# Patient Record
Sex: FEMALE | Race: WHITE | NOT HISPANIC OR LATINO | URBAN - METROPOLITAN AREA
[De-identification: names, ages, dates, MRNs, and addresses within clinical notes are randomized per-mention and may not be internally consistent; named-entity substitution may affect disease eponyms.]

---

## 2024-07-15 PROBLEM — Z00.00 ENCOUNTER FOR PREVENTIVE HEALTH EXAMINATION: Status: ACTIVE | Noted: 2024-07-15

## 2024-07-16 ENCOUNTER — OUTPATIENT (OUTPATIENT)
Dept: OUTPATIENT SERVICES | Facility: HOSPITAL | Age: 34
LOS: 1 days | End: 2024-07-16

## 2024-07-16 ENCOUNTER — APPOINTMENT (OUTPATIENT)
Dept: OBGYN | Facility: CLINIC | Age: 34
End: 2024-07-16
Payer: COMMERCIAL

## 2024-07-16 VITALS
TEMPERATURE: 98 F | OXYGEN SATURATION: 98 % | RESPIRATION RATE: 16 BRPM | SYSTOLIC BLOOD PRESSURE: 118 MMHG | HEART RATE: 65 BPM | DIASTOLIC BLOOD PRESSURE: 72 MMHG | WEIGHT: 134.92 LBS | HEIGHT: 67 IN

## 2024-07-16 VITALS
BODY MASS INDEX: 20.25 KG/M2 | DIASTOLIC BLOOD PRESSURE: 70 MMHG | SYSTOLIC BLOOD PRESSURE: 118 MMHG | WEIGHT: 129 LBS | HEIGHT: 67 IN

## 2024-07-16 DIAGNOSIS — O02.1 MISSED ABORTION: ICD-10-CM

## 2024-07-16 DIAGNOSIS — K08.409 PARTIAL LOSS OF TEETH, UNSPECIFIED CAUSE, UNSPECIFIED CLASS: Chronic | ICD-10-CM

## 2024-07-16 DIAGNOSIS — Z98.890 OTHER SPECIFIED POSTPROCEDURAL STATES: Chronic | ICD-10-CM

## 2024-07-16 LAB
APTT BLD: 29 SEC — SIGNIFICANT CHANGE UP (ref 24.5–35.6)
BLD GP AB SCN SERPL QL: NEGATIVE — SIGNIFICANT CHANGE UP
FIBRINOGEN PPP-MCNC: 304 MG/DL — SIGNIFICANT CHANGE UP (ref 200–465)
HCT VFR BLD CALC: 37.8 % — SIGNIFICANT CHANGE UP (ref 34.5–45)
HGB BLD-MCNC: 12.9 G/DL — SIGNIFICANT CHANGE UP (ref 11.5–15.5)
INR BLD: 0.97 RATIO — SIGNIFICANT CHANGE UP (ref 0.85–1.18)
MCHC RBC-ENTMCNC: 30 PG — SIGNIFICANT CHANGE UP (ref 27–34)
MCHC RBC-ENTMCNC: 34.1 GM/DL — SIGNIFICANT CHANGE UP (ref 32–36)
MCV RBC AUTO: 87.9 FL — SIGNIFICANT CHANGE UP (ref 80–100)
NRBC # BLD: 0 /100 WBCS — SIGNIFICANT CHANGE UP (ref 0–0)
NRBC # FLD: 0 K/UL — SIGNIFICANT CHANGE UP (ref 0–0)
PLATELET # BLD AUTO: 275 K/UL — SIGNIFICANT CHANGE UP (ref 150–400)
PROTHROM AB SERPL-ACNC: 10.9 SEC — SIGNIFICANT CHANGE UP (ref 9.5–13)
RBC # BLD: 4.3 M/UL — SIGNIFICANT CHANGE UP (ref 3.8–5.2)
RBC # FLD: 13.6 % — SIGNIFICANT CHANGE UP (ref 10.3–14.5)
RH IG SCN BLD-IMP: POSITIVE — SIGNIFICANT CHANGE UP
WBC # BLD: 10.3 K/UL — SIGNIFICANT CHANGE UP (ref 3.8–10.5)
WBC # FLD AUTO: 10.3 K/UL — SIGNIFICANT CHANGE UP (ref 3.8–10.5)

## 2024-07-16 PROCEDURE — S0190: CPT

## 2024-07-16 PROCEDURE — 99204 OFFICE O/P NEW MOD 45 MIN: CPT | Mod: 25

## 2024-07-16 PROCEDURE — 76815 OB US LIMITED FETUS(S): CPT

## 2024-07-16 NOTE — H&P PST ADULT - NSANTHOSAYNRD_GEN_A_CORE
No. JOHNIE screening performed.  STOP BANG Legend: 0-2 = LOW Risk; 3-4 = INTERMEDIATE Risk; 5-8 = HIGH Risk

## 2024-07-16 NOTE — H&P PST ADULT - HISTORY OF PRESENT ILLNESS
34 yr old female presents with missed , reports 17 week sono noted missed , estimated gestation ~14 weeks, reports mild vaginal bleeding

## 2024-07-16 NOTE — H&P PST ADULT - PROBLEM SELECTOR PLAN 1
Patient scheduled for surgery on: 7/18/24  Provided with verbal and written presurgical instructions

## 2024-07-17 ENCOUNTER — INPATIENT (INPATIENT)
Facility: HOSPITAL | Age: 34
LOS: 0 days | Discharge: HOME | DRG: 779 | End: 2024-07-18
Attending: INTERNAL MEDICINE | Admitting: INTERNAL MEDICINE
Payer: SELF-PAY

## 2024-07-17 VITALS
DIASTOLIC BLOOD PRESSURE: 75 MMHG | HEIGHT: 68 IN | WEIGHT: 130.07 LBS | OXYGEN SATURATION: 99 % | RESPIRATION RATE: 20 BRPM | HEART RATE: 93 BPM | SYSTOLIC BLOOD PRESSURE: 109 MMHG | TEMPERATURE: 98 F

## 2024-07-17 DIAGNOSIS — N93.9 ABNORMAL UTERINE AND VAGINAL BLEEDING, UNSPECIFIED: ICD-10-CM

## 2024-07-17 DIAGNOSIS — Z98.890 OTHER SPECIFIED POSTPROCEDURAL STATES: Chronic | ICD-10-CM

## 2024-07-17 DIAGNOSIS — K08.409 PARTIAL LOSS OF TEETH, UNSPECIFIED CAUSE, UNSPECIFIED CLASS: Chronic | ICD-10-CM

## 2024-07-17 LAB
ALBUMIN SERPL ELPH-MCNC: 3.8 G/DL — SIGNIFICANT CHANGE UP (ref 3.3–5)
ALP SERPL-CCNC: 65 U/L — SIGNIFICANT CHANGE UP (ref 40–120)
ALT FLD-CCNC: 7 U/L — LOW (ref 10–45)
ANION GAP SERPL CALC-SCNC: 15 MMOL/L — SIGNIFICANT CHANGE UP (ref 5–17)
APPEARANCE UR: CLEAR — SIGNIFICANT CHANGE UP
APTT BLD: 26.1 SEC — SIGNIFICANT CHANGE UP (ref 24.5–35.6)
AST SERPL-CCNC: 15 U/L — SIGNIFICANT CHANGE UP (ref 10–40)
BACTERIA # UR AUTO: NEGATIVE /HPF — SIGNIFICANT CHANGE UP
BASOPHILS # BLD AUTO: 0.03 K/UL — SIGNIFICANT CHANGE UP (ref 0–0.2)
BASOPHILS # BLD AUTO: 0.04 K/UL — SIGNIFICANT CHANGE UP (ref 0–0.2)
BASOPHILS # BLD AUTO: 0.05 K/UL — SIGNIFICANT CHANGE UP (ref 0–0.2)
BASOPHILS NFR BLD AUTO: 0.2 % — SIGNIFICANT CHANGE UP (ref 0–2)
BASOPHILS NFR BLD AUTO: 0.3 % — SIGNIFICANT CHANGE UP (ref 0–2)
BASOPHILS NFR BLD AUTO: 0.4 % — SIGNIFICANT CHANGE UP (ref 0–2)
BILIRUB SERPL-MCNC: 1.3 MG/DL — HIGH (ref 0.2–1.2)
BILIRUB UR-MCNC: NEGATIVE — SIGNIFICANT CHANGE UP
BLD GP AB SCN SERPL QL: NEGATIVE — SIGNIFICANT CHANGE UP
BUN SERPL-MCNC: 11 MG/DL — SIGNIFICANT CHANGE UP (ref 7–23)
C TRACH RRNA SPEC QL NAA+PROBE: NOT DETECTED
CALCIUM SERPL-MCNC: 9 MG/DL — SIGNIFICANT CHANGE UP (ref 8.4–10.5)
CAST: 0 /LPF — SIGNIFICANT CHANGE UP (ref 0–4)
CHLORIDE SERPL-SCNC: 102 MMOL/L — SIGNIFICANT CHANGE UP (ref 96–108)
CO2 SERPL-SCNC: 22 MMOL/L — SIGNIFICANT CHANGE UP (ref 22–31)
COLOR SPEC: ABNORMAL
CREAT SERPL-MCNC: 0.69 MG/DL — SIGNIFICANT CHANGE UP (ref 0.5–1.3)
DIFF PNL FLD: ABNORMAL
EGFR: 118 ML/MIN/1.73M2 — SIGNIFICANT CHANGE UP
EOSINOPHIL # BLD AUTO: 0.03 K/UL — SIGNIFICANT CHANGE UP (ref 0–0.5)
EOSINOPHIL # BLD AUTO: 0.06 K/UL — SIGNIFICANT CHANGE UP (ref 0–0.5)
EOSINOPHIL # BLD AUTO: 0.13 K/UL — SIGNIFICANT CHANGE UP (ref 0–0.5)
EOSINOPHIL NFR BLD AUTO: 0.2 % — SIGNIFICANT CHANGE UP (ref 0–6)
EOSINOPHIL NFR BLD AUTO: 0.4 % — SIGNIFICANT CHANGE UP (ref 0–6)
EOSINOPHIL NFR BLD AUTO: 1.1 % — SIGNIFICANT CHANGE UP (ref 0–6)
GLUCOSE SERPL-MCNC: 100 MG/DL — HIGH (ref 70–99)
GLUCOSE UR QL: NEGATIVE MG/DL — SIGNIFICANT CHANGE UP
HCG SERPL-ACNC: 543.2 MIU/ML — HIGH
HCT VFR BLD CALC: 30.1 % — LOW (ref 34.5–45)
HCT VFR BLD CALC: 32.5 % — LOW (ref 34.5–45)
HCT VFR BLD CALC: 37.4 % — SIGNIFICANT CHANGE UP (ref 34.5–45)
HGB BLD-MCNC: 10.3 G/DL — LOW (ref 11.5–15.5)
HGB BLD-MCNC: 11.1 G/DL — LOW (ref 11.5–15.5)
HGB BLD-MCNC: 12.3 G/DL — SIGNIFICANT CHANGE UP (ref 11.5–15.5)
IMM GRANULOCYTES NFR BLD AUTO: 0.4 % — SIGNIFICANT CHANGE UP (ref 0–0.9)
IMM GRANULOCYTES NFR BLD AUTO: 0.5 % — SIGNIFICANT CHANGE UP (ref 0–0.9)
IMM GRANULOCYTES NFR BLD AUTO: 0.6 % — SIGNIFICANT CHANGE UP (ref 0–0.9)
INR BLD: 1.03 RATIO — SIGNIFICANT CHANGE UP (ref 0.85–1.18)
KETONES UR-MCNC: 15 MG/DL
LEUKOCYTE ESTERASE UR-ACNC: ABNORMAL
LYMPHOCYTES # BLD AUTO: 1.32 K/UL — SIGNIFICANT CHANGE UP (ref 1–3.3)
LYMPHOCYTES # BLD AUTO: 1.37 K/UL — SIGNIFICANT CHANGE UP (ref 1–3.3)
LYMPHOCYTES # BLD AUTO: 1.94 K/UL — SIGNIFICANT CHANGE UP (ref 1–3.3)
LYMPHOCYTES # BLD AUTO: 16.2 % — SIGNIFICANT CHANGE UP (ref 13–44)
LYMPHOCYTES # BLD AUTO: 8.6 % — LOW (ref 13–44)
LYMPHOCYTES # BLD AUTO: 9 % — LOW (ref 13–44)
MCHC RBC-ENTMCNC: 29.6 PG — SIGNIFICANT CHANGE UP (ref 27–34)
MCHC RBC-ENTMCNC: 30.1 PG — SIGNIFICANT CHANGE UP (ref 27–34)
MCHC RBC-ENTMCNC: 30.3 PG — SIGNIFICANT CHANGE UP (ref 27–34)
MCHC RBC-ENTMCNC: 32.9 GM/DL — SIGNIFICANT CHANGE UP (ref 32–36)
MCHC RBC-ENTMCNC: 34.2 GM/DL — SIGNIFICANT CHANGE UP (ref 32–36)
MCHC RBC-ENTMCNC: 34.2 GM/DL — SIGNIFICANT CHANGE UP (ref 32–36)
MCV RBC AUTO: 88.1 FL — SIGNIFICANT CHANGE UP (ref 80–100)
MCV RBC AUTO: 88.5 FL — SIGNIFICANT CHANGE UP (ref 80–100)
MCV RBC AUTO: 90.1 FL — SIGNIFICANT CHANGE UP (ref 80–100)
MONOCYTES # BLD AUTO: 0.59 K/UL — SIGNIFICANT CHANGE UP (ref 0–0.9)
MONOCYTES # BLD AUTO: 0.78 K/UL — SIGNIFICANT CHANGE UP (ref 0–0.9)
MONOCYTES # BLD AUTO: 0.8 K/UL — SIGNIFICANT CHANGE UP (ref 0–0.9)
MONOCYTES NFR BLD AUTO: 4 % — SIGNIFICANT CHANGE UP (ref 2–14)
MONOCYTES NFR BLD AUTO: 4.9 % — SIGNIFICANT CHANGE UP (ref 2–14)
MONOCYTES NFR BLD AUTO: 6.7 % — SIGNIFICANT CHANGE UP (ref 2–14)
N GONORRHOEA RRNA SPEC QL NAA+PROBE: NOT DETECTED
NEUTROPHILS # BLD AUTO: 12.63 K/UL — HIGH (ref 1.8–7.4)
NEUTROPHILS # BLD AUTO: 13.63 K/UL — HIGH (ref 1.8–7.4)
NEUTROPHILS # BLD AUTO: 9 K/UL — HIGH (ref 1.8–7.4)
NEUTROPHILS NFR BLD AUTO: 75.2 % — SIGNIFICANT CHANGE UP (ref 43–77)
NEUTROPHILS NFR BLD AUTO: 85.2 % — HIGH (ref 43–77)
NEUTROPHILS NFR BLD AUTO: 86.1 % — HIGH (ref 43–77)
NITRITE UR-MCNC: NEGATIVE — SIGNIFICANT CHANGE UP
NRBC # BLD: 0 /100 WBCS — SIGNIFICANT CHANGE UP (ref 0–0)
PH UR: 7 — SIGNIFICANT CHANGE UP (ref 5–8)
PLATELET # BLD AUTO: 207 K/UL — SIGNIFICANT CHANGE UP (ref 150–400)
PLATELET # BLD AUTO: 224 K/UL — SIGNIFICANT CHANGE UP (ref 150–400)
PLATELET # BLD AUTO: 251 K/UL — SIGNIFICANT CHANGE UP (ref 150–400)
POTASSIUM SERPL-MCNC: 3.4 MMOL/L — LOW (ref 3.5–5.3)
POTASSIUM SERPL-SCNC: 3.4 MMOL/L — LOW (ref 3.5–5.3)
PROT SERPL-MCNC: 6.5 G/DL — SIGNIFICANT CHANGE UP (ref 6–8.3)
PROT UR-MCNC: SIGNIFICANT CHANGE UP MG/DL
PROTHROM AB SERPL-ACNC: 11.3 SEC — SIGNIFICANT CHANGE UP (ref 9.5–13)
RBC # BLD: 3.4 M/UL — LOW (ref 3.8–5.2)
RBC # BLD: 3.69 M/UL — LOW (ref 3.8–5.2)
RBC # BLD: 4.15 M/UL — SIGNIFICANT CHANGE UP (ref 3.8–5.2)
RBC # FLD: 13.5 % — SIGNIFICANT CHANGE UP (ref 10.3–14.5)
RBC # FLD: 13.6 % — SIGNIFICANT CHANGE UP (ref 10.3–14.5)
RBC # FLD: 13.6 % — SIGNIFICANT CHANGE UP (ref 10.3–14.5)
RBC CASTS # UR COMP ASSIST: 603 /HPF — HIGH (ref 0–4)
RH IG SCN BLD-IMP: POSITIVE — SIGNIFICANT CHANGE UP
SODIUM SERPL-SCNC: 139 MMOL/L — SIGNIFICANT CHANGE UP (ref 135–145)
SOURCE AMPLIFICATION: NORMAL
SP GR SPEC: 1.01 — SIGNIFICANT CHANGE UP (ref 1–1.03)
SQUAMOUS # UR AUTO: 3 /HPF — SIGNIFICANT CHANGE UP (ref 0–5)
UROBILINOGEN FLD QL: 1 MG/DL — SIGNIFICANT CHANGE UP (ref 0.2–1)
WBC # BLD: 11.97 K/UL — HIGH (ref 3.8–10.5)
WBC # BLD: 14.68 K/UL — HIGH (ref 3.8–10.5)
WBC # BLD: 15.97 K/UL — HIGH (ref 3.8–10.5)
WBC # FLD AUTO: 11.97 K/UL — HIGH (ref 3.8–10.5)
WBC # FLD AUTO: 14.68 K/UL — HIGH (ref 3.8–10.5)
WBC # FLD AUTO: 15.97 K/UL — HIGH (ref 3.8–10.5)
WBC UR QL: 12 /HPF — HIGH (ref 0–5)

## 2024-07-17 PROCEDURE — 88305 TISSUE EXAM BY PATHOLOGIST: CPT | Mod: 26

## 2024-07-17 PROCEDURE — 88291 CYTO/MOLECULAR REPORT: CPT

## 2024-07-17 PROCEDURE — 93308 TTE F-UP OR LMTD: CPT | Mod: 26

## 2024-07-17 PROCEDURE — 99285 EMERGENCY DEPT VISIT HI MDM: CPT

## 2024-07-17 RX ORDER — SODIUM CHLORIDE 0.9 % (FLUSH) 0.9 %
1000 SYRINGE (ML) INJECTION ONCE
Refills: 0 | Status: COMPLETED | OUTPATIENT
Start: 2024-07-17 | End: 2024-07-17

## 2024-07-17 RX ORDER — POTASSIUM CHLORIDE 600 MG/1
40 TABLET, FILM COATED, EXTENDED RELEASE ORAL ONCE
Refills: 0 | Status: COMPLETED | OUTPATIENT
Start: 2024-07-17 | End: 2024-07-17

## 2024-07-17 RX ORDER — SODIUM CHLORIDE 0.9 % (FLUSH) 0.9 %
1000 SYRINGE (ML) INJECTION
Refills: 0 | Status: DISCONTINUED | OUTPATIENT
Start: 2024-07-17 | End: 2024-07-18

## 2024-07-17 RX ORDER — DEXTROSE MONOHYDRATE AND SODIUM CHLORIDE 5; .3 G/100ML; G/100ML
1000 INJECTION, SOLUTION INTRAVENOUS ONCE
Refills: 0 | Status: COMPLETED | OUTPATIENT
Start: 2024-07-17 | End: 2024-07-17

## 2024-07-17 RX ADMIN — Medication 1000 MILLILITER(S): at 16:18

## 2024-07-17 RX ADMIN — DEXTROSE MONOHYDRATE AND SODIUM CHLORIDE 1000 MILLILITER(S): 5; .3 INJECTION, SOLUTION INTRAVENOUS at 19:39

## 2024-07-17 RX ADMIN — Medication 100 MILLILITER(S): at 23:30

## 2024-07-17 RX ADMIN — POTASSIUM CHLORIDE 40 MILLIEQUIVALENT(S): 600 TABLET, FILM COATED, EXTENDED RELEASE ORAL at 16:18

## 2024-07-17 NOTE — ED PROCEDURE NOTE - CPROC ED TIME OUT STATEMENT1
“Patient's name, , procedure and correct site were confirmed during the [place] Timeout.”
“Patient's name, , procedure and correct site were confirmed during the [place] Timeout.”

## 2024-07-17 NOTE — H&P ADULT - HISTORY OF PRESENT ILLNESS
34-year-old female no reported past medical history , 17 weeks gestation recently found to have miscarriage planned for D&C tomorrow with Dr Koroma, presents brought in by ambulance for heavy vaginal bleeding.  Was seen by a [facility] yesterday, took 1 dose of mifepristone this morning, a few hours after taking developed severe abdominal cramping and has had bright red bleeding since then passing clots and suspected products.  Patient soaked through 2 towels.  He is currently bleeding.  Reports associated lightheadedness/dizziness but no LOC, reports associated lethargy.

## 2024-07-17 NOTE — CONSULT NOTE ADULT - SUBJECTIVE AND OBJECTIVE BOX
HAI LEE    34y    Female    [id number]    **incomplete**    HPI: 35yo  @17w6d (LMP 3/14), recently diagnosed with IUFD, presents with heavy VB. Pt currently visiting from [facility] and diagnosed with IUFD at [facility] on . Seen by FP clinic , sono revealed 14w at that time and scheduled for D&E . Pt given mifepristone to take today and misoprostol . Pt took mifeprostone this morning at 8am as instructed and developed heavy VB at 11:00am. Was soaking towels and passing large clots in one hour and called EMS. Pt endorses lightheadedness and dizziness intermittently. Developed nausea in ED, but denies vomiting.  Denies fevers/chills/vomiting.      Name of Ob/Gyn Physician: Lives in [facility], saw [facility]  and  clinic   OBHx:  FT  x2 (, )  GynHx: Lsc ov cystectomy x2  PMHx: Denies  PSHx: Lsc ov cystectomy x2  Meds: Denies  All: NKDA      Vital Signs Last 24 Hrs  T(C): 36.6 (2024 12:58), Max: 36.6 (2024 12:58)  T(F): 97.8 (2024 12:58), Max: 97.8 (2024 12:58)  HR: 93 (2024 12:58) (93 - 93)  BP: 109/75 (2024 12:58) (109/75 - 109/75)  BP(mean): --  RR: 20 (2024 12:58) (20 - 20)  SpO2: 99% (2024 12:58) (99% - 99%)    Parameters below as of 2024 12:58  Patient On (Oxygen Delivery Method): room air        PHYSICAL EXAM:   Exam performed with Chaperone Dr. Lafleur PGY4  Gen: NAD     Respiratory: Breathing comfortably on RA  Abd: Soft, non-tender, non-distended  Pelvic: Speculum - copious amount of dark red clots and placental tissue in vagina, manually extracted with scant bleeding noted from cervical os afterward  Neuro: AOx3      LABS:                        12.3   11.97 )-----------( 251      ( 2024 13:45 )             37.4         139  |  102  |  11  ----------------------------<  100<H>  3.4<L>   |  22  |  0.69    Ca    9.0      2024 13:45    TPro  6.5  /  Alb  3.8  /  TBili  1.3<H>  /  DBili  x   /  AST  15  /  ALT  7<L>  /  AlkPhos  65      PT/INR - ( 2024 13:45 )   PT: 11.3 sec;   INR: 1.03 ratio         PTT - ( 2024 13:45 )  PTT:26.1 sec  Urinalysis Basic - ( 2024 13:45 )    Color: x / Appearance: x / SG: x / pH: x  Gluc: 100 mg/dL / Ketone: x  / Bili: x / Urobili: x   Blood: x / Protein: x / Nitrite: x   Leuk Esterase: x / RBC: x / WBC x   Sq Epi: x / Non Sq Epi: x / Bacteria: x        RADIOLOGY & ADDITIONAL STUDIES:  ACC: [id number] EXAM:  US OB GRT THAN 14 WKS 1ST GEST   ORDERED BY: SHERI SCHWARTZ     PROCEDURE DATE:  2024          INTERPRETATION:  CLINICAL INFORMATION: Second trimester pregnancy with   demise, scheduled for D&E. Presents today with heavy vaginal bleeding.    LMP: 2024    COMPARISON: None available.    TECHNIQUE:  Transabdominal pelvic sonogram only. Color and Spectral Doppler was   performed.    FINDINGS:  Uterus: Anteverted. 14.8 cm x 7.3 cm x 8.1 cm. Coarse myometrial texture.  Endometrium: 50 mm. Within normal limits.  Vagina: Vaginal canal is distended with avascular soft tissue consistent   with  in progress.    Right ovary: 3.2 cm x 1.9 cm x 4.0 cm. Within normal limits. Normal   arterial and venous waveforms.  Left ovary: 5.7 cm x 2.1 cm x 4.2 cm. Parenchymal distortion consistent   with history of prior cystectomy. Normal arterial and venous waveforms.    Fluid: None.    IMPRESSION:  Products of conception within the vaginal canal consistent with    in progress.        --- End of Report ---            LUIZ BROWN MD; Attending Radiologist  This document has been electronically signed. 2024  2:50PM HAI LEE    34y    Female    [id number]    HPI: 35yo  @17w6d (LMP 3/14), recently diagnosed with IUFD, presents with heavy VB. Pt currently visiting from [facility] and diagnosed with IUFD at [facility] on . Seen by FP clinic , sono revealed 14w at that time and scheduled for D&E . Pt given mifepristone to take today and misoprostol . Pt took mifeprostone this morning at 8am as instructed and developed heavy VB at 11:00am. Was soaking towels and passing large clots in one hour and called EMS. Pt endorses lightheadedness and dizziness intermittently. Developed nausea in ED, but denies vomiting.  Denies fevers/chills/vomiting.      Name of Ob/Gyn Physician: Lives in [facility], saw [facility]  and FP clinic   OBHx:  FT  x2 (, )  GynHx: Lsc ov cystectomy x2  PMHx: Denies  PSHx: Lsc ov cystectomy x2  Meds: Denies  All: NKDA      Vital Signs Last 24 Hrs  T(C): 36.6 (2024 12:58), Max: 36.6 (2024 12:58)  T(F): 97.8 (2024 12:58), Max: 97.8 (2024 12:58)  HR: 93 (2024 12:58) (93 - 93)  BP: 109/75 (2024 12:58) (109/75 - 109/75)  BP(mean): --  RR: 20 (2024 12:58) (20 - 20)  SpO2: 99% (2024 12:58) (99% - 99%)    Parameters below as of 2024 12:58  Patient On (Oxygen Delivery Method): room air        PHYSICAL EXAM:   Exam performed with Chaperone Dr. Lafleur PGY4  Gen: NAD     Respiratory: Breathing comfortably on RA  Abd: Soft, non-tender, non-distended  Pelvic: Speculum - copious amount of dark red clots and placental tissue in vagina, manually extracted with scant bleeding noted from cervical os afterward  Neuro: AOx3      LABS:                        12.3   11.97 )-----------( 251      ( 2024 13:45 )             37.4         139  |  102  |  11  ----------------------------<  100<H>  3.4<L>   |  22  |  0.69    Ca    9.0      2024 13:45    TPro  6.5  /  Alb  3.8  /  TBili  1.3<H>  /  DBili  x   /  AST  15  /  ALT  7<L>  /  AlkPhos  65      PT/INR - ( 2024 13:45 )   PT: 11.3 sec;   INR: 1.03 ratio         PTT - ( 2024 13:45 )  PTT:26.1 sec  Urinalysis Basic - ( 2024 13:45 )    Color: x / Appearance: x / SG: x / pH: x  Gluc: 100 mg/dL / Ketone: x  / Bili: x / Urobili: x   Blood: x / Protein: x / Nitrite: x   Leuk Esterase: x / RBC: x / WBC x   Sq Epi: x / Non Sq Epi: x / Bacteria: x        RADIOLOGY & ADDITIONAL STUDIES:  ACC: [id number] EXAM:  US OB GRT THAN 14 WKS 1ST GEST   ORDERED BY: SHERI SCHWARTZ     PROCEDURE DATE:  2024          INTERPRETATION:  CLINICAL INFORMATION: Second trimester pregnancy with   demise, scheduled for D&E. Presents today with heavy vaginal bleeding.    LMP: 2024    COMPARISON: None available.    TECHNIQUE:  Transabdominal pelvic sonogram only. Color and Spectral Doppler was   performed.    FINDINGS:  Uterus: Anteverted. 14.8 cm x 7.3 cm x 8.1 cm. Coarse myometrial texture.  Endometrium: 50 mm. Within normal limits.  Vagina: Vaginal canal is distended with avascular soft tissue consistent   with  in progress.    Right ovary: 3.2 cm x 1.9 cm x 4.0 cm. Within normal limits. Normal   arterial and venous waveforms.  Left ovary: 5.7 cm x 2.1 cm x 4.2 cm. Parenchymal distortion consistent   with history of prior cystectomy. Normal arterial and venous waveforms.    Fluid: None.    IMPRESSION:  Products of conception within the vaginal canal consistent with    in progress.        --- End of Report ---            LUIZ BROWN MD; Attending Radiologist  This document has been electronically signed. 2024  2:50PM HAI LEE    34y    Female    [id number]    HPI: 35yo  @17w6d (LMP 3/14), recently diagnosed with IUFD, presents with heavy VB. Pt currently visiting from [facility] and diagnosed with IUFD at [facility] on . Seen by family planning , sono revealed IUFD measuring 14w at that time and scheduled for D&E . Pt given mifepristone to take today and misoprostol . Pt took mifepristone this morning at 8am as instructed and developed heavy VB at 1100am. Was soaking towels and passing large clots in one hour with the sensation similar to giving birth and called EMS. Pt endorses lightheadedness and dizziness intermittently. Developed nausea in ED, but denies vomiting. Last ate around 8am this morning.    Denies fevers/chills/vomiting.      Name of Ob/Gyn Physician: Lives in [facility], saw [facility]  and FP clinic   OBHx:  FT  x2 (, )  GynHx: Lsc ov cystectomy x2  PMHx: Denies  PSHx: Lsc ov cystectomy x2  Meds: Denies  All: NKDA  Vital Signs Last 24 Hrs  T(C): 36.6 (2024 12:58), Max: 36.6 (2024 12:58)  T(F): 97.8 (2024 12:58), Max: 97.8 (2024 12:58)  HR: 93 (2024 12:58) (93 - 93)  BP: 109/75 (2024 12:58) (109/75 - 109/75)  BP(mean): --  RR: 20 (2024 12:58) (20 - 20)  SpO2: 99% (2024 12:58) (99% - 99%)    Parameters below as of 2024 12:58  Patient On (Oxygen Delivery Method): room air    PHYSICAL EXAM:   Exam performed with Dr. Lafleur PGY4  Gen: NAD   Respiratory: Breathing comfortably on RA  Abd: Soft, non-tender, non-distended  Pelvic: Speculum - copious amount of dark red clots and placental tissue in vagina, evacuated and  placenta manually removed from vaginal canal with scant bleeding from cervical os noted afterwards  Neuro: AOx3    LABS:                        12.3   11.97 )-----------( 251      ( 2024 13:45 )             37.4         139  |  102  |  11  ----------------------------<  100<H>  3.4<L>   |  22  |  0.69    Ca    9.0      2024 13:45    TPro  6.5  /  Alb  3.8  /  TBili  1.3<H>  /  DBili  x   /  AST  15  /  ALT  7<L>  /  AlkPhos  65      PT/INR - ( 2024 13:45 )   PT: 11.3 sec;   INR: 1.03 ratio       PTT - ( 2024 13:45 )  PTT:26.1 sec  Urinalysis Basic - ( 2024 13:45 )    Color: x / Appearance: x / SG: x / pH: x  Gluc: 100 mg/dL / Ketone: x  / Bili: x / Urobili: x   Blood: x / Protein: x / Nitrite: x   Leuk Esterase: x / RBC: x / WBC x   Sq Epi: x / Non Sq Epi: x / Bacteria: x    RADIOLOGY & ADDITIONAL STUDIES:  ACC: [id number] EXAM:  US OB GRT THAN 14 WKS 1ST GEST   ORDERED BY: SHERI SCHWARTZ     PROCEDURE DATE:  2024      INTERPRETATION:  CLINICAL INFORMATION: Second trimester pregnancy with   demise, scheduled for D&E. Presents today with heavy vaginal bleeding.    LMP: 2024    COMPARISON: None available.    TECHNIQUE:  Transabdominal pelvic sonogram only. Color and Spectral Doppler was   performed.    FINDINGS:  Uterus: Anteverted. 14.8 cm x 7.3 cm x 8.1 cm. Coarse myometrial texture.  Endometrium: 50 mm. Within normal limits.  Vagina: Vaginal canal is distended with avascular soft tissue consistent   with  in progress.    Right ovary: 3.2 cm x 1.9 cm x 4.0 cm. Within normal limits. Normal   arterial and venous waveforms.  Left ovary: 5.7 cm x 2.1 cm x 4.2 cm. Parenchymal distortion consistent   with history of prior cystectomy. Normal arterial and venous waveforms.    Fluid: None.    IMPRESSION:  Products of conception within the vaginal canal consistent with    in progress.    --- End of Report ---    LUIZ BROWN MD; Attending Radiologist  This document has been electronically signed. 2024  2:50PM

## 2024-07-17 NOTE — ED PROVIDER NOTE - PROGRESS NOTE DETAILS
OB at bedside - prelim US eval no apparent retained products, final read pending. final recs pending - Jacinto Moreno PA-C H/H drop 1pt after approx 2 1/2 hrs, leukocytosis uptrending. Spoke with OBGYN who feel leukocytosis may be reactive. Pt fluids going now, OBGYN to reassess. Plan to place in CDU for CBC trend. pt agreeable. case d/w CDU TAD Lambert, will evaluate pt - Jacinto Moreno PA-C Attending MD Zhou: Informed patient had syncopal episode in route to bathroom.  Patient reports feels persistently dizzy, reports remembers getting up to go to the bathroom, began feeling lightheaded, reports as was walking had LOC, does not remember falling.  RN reports that patient did not fall as she was being accompanied.  Will give another liter of IVFs, will keep on monitor, will admit patient.

## 2024-07-17 NOTE — ED PROVIDER NOTE - ATTENDING APP SHARED VISIT CONTRIBUTION OF CARE
34-year-old female no reported past medical history , 17 weeks gestation recently found to have miscarriage planned for D&C tomorrow with Dr Koroma, presents brought in by ambulance for heavy vaginal bleeding.  Patient today took her mifepristone this morning and then subsequently started having cramping and burping around bleeding which she thinks is passing products of conception.  But came in she still bleeding no abdominal pain this time hemodynamically stable OB/GYN consult transvaginal ultrasound labs were ordered on pelvic noted to have open eyes OB/GYN was made aware currently at the bedside to perform pelvic and see if there is any products in her os which need to be removed but as per them it appears that she already passed the fetus and has no retained products at this time awaiting official us rad

## 2024-07-17 NOTE — CONSULT NOTE ADULT - ASSESSMENT
33yo  @17w6d (LMP 3/14), recently diagnosed with IUFD, presents with heavy VB. Pt sp Mifepristone () and scheduled for D&E with family planning tomorrow . Pt symptomatic with intermittent lightheadedness/dizziness. H/H stable from previous . Exam with copious amount of dark red clots and placental tissue in vagina, manually extracted with scant bleeding noted from cervical os afterward. Sono prior to exam with POC within vaginal canal. Sono and exam consistent with complete . Low c/f RPOC at this time.    Plan:  - Recommend IV hydration and orthostatic vitals  - H/H stable from previous, recommend repeat CBC to ensure stable  - TVUS with products of conception within the vaginal canal c/w  in progress. Low c/f RPOC at this time  - Exam with copious amount of dark red clots and placental tissue in vagina, manually extracted with scant bleeding noted from cervical os afterward. Sent to pathology, will f/u results      **incomplete**  Pt seen with Dr. Lafleur PGY4 and discussed with attending physician, Dr. Cristy Herrera PGY2  33yo  @17w6d (LMP 3/14), recently diagnosed with IUFD, presents with heavy VB. Pt sp Mifepristone () and scheduled for D&E with family planning tomorrow . Pt symptomatic with intermittent lightheadedness/dizziness. H/H stable from previous . Exam with copious amount of dark red clots and placental tissue in vagina, manually extracted with scant bleeding noted from cervical os afterward. Sono prior to exam with POC within vaginal canal. Sono and exam consistent with complete . Low c/f RPOC at this time.    Plan:  - Recommend IV hydration and orthostatic vitals  - H/H stable from previous, recommend repeat CBC to ensure stable  - TVUS with products of conception within the vaginal canal c/w  in progress. Low c/f RPOC at this time  - Exam with copious amount of dark red clots and placental tissue in vagina, manually extracted with scant bleeding noted from cervical os afterward. Sent to pathology, will f/u results    Pt seen with Dr. Lafleur PGY4 and discussed with attending physician, Dr. Cristy Herrera PGY2    ADDENDUM  @ 445p    - Orthos performed prior to IV hydration   - Pt failed orthos due to presence of lightheadedness upon standing  35yo  @17w6d (LMP 3/14), recently diagnosed with IUFD, presents with heavy VB. Pt sp Mifepristone () and scheduled for D&E with family planning tomorrow . Pt symptomatic with intermittent lightheadedness/dizziness. H/H stable from previous . Exam with copious amount of dark red clots and placental tissue in vagina, manually extracted with scant bleeding noted from cervical os afterward. Sono prior to exam with POC within vaginal canal. Sono and exam consistent with complete . Low c/f RPOC at this time.    Plan:  - Recommend IV hydration and orthostatic vitals  - H/H stable from previous, recommend repeat CBC to ensure stable  - TVUS with products of conception within the vaginal canal c/w  in progress. Low c/f RPOC at this time  - Exam with copious amount of dark red clots and placental tissue in vagina, manually extracted with scant bleeding noted from cervical os afterward. Sent to pathology, will f/u results    Pt seen with Dr. Lafleur PGY4 and discussed with attending physician, Dr. Cristy Herrera PGY2    ADDENDUM  @ 445p    - Orthos performed prior to IV hydration   - Pt failed orthos due to presence of lightheadedness upon standing  - STAT CBC with H/H 12.3/37.4->11.1/32.5  - WBC 11.97 -> 15.97, uptrend as expected   - Resuscitate with IV hydration and repeat orthos after  - Continue to monitor    Jeovanny Herrera PGY2  33yo  @17w6d (LMP 3/14), recently diagnosed with IUFD, presents with heavy VB. Pt sp Mifepristone () and scheduled for D&E with family planning tomorrow . Pt symptomatic with intermittent lightheadedness/dizziness. H/H stable from previous . Exam with copious amount of dark red clots and placental tissue in vagina, manually extracted with scant bleeding noted from cervical os afterward. Sono prior to exam with POC within vaginal canal. Sono and exam consistent with complete . Low c/f RPOC at this time.    Plan:  - Recommend IV hydration and orthostatic vitals  - H/H stable from previous, recommend repeat CBC to ensure stable  - TVUS with products of conception within the vaginal canal c/w  in progress with low concern for retained products at this time  - Patient likely passed fetus at home, presenting with passed placenta in vaigna, evacuated ~300cc blood and clot from vaginal vault at time of eval  - Exam with copious amount of dark red clots and placental tissue in vagina, manually extracted with scant bleeding noted from cervical os afterward. Sent to pathology, will f/u results, pt desires genetics and genetics consents sent with placental path    Pt seen with Dr. Lafleur PGY4 and discussed with attending physician, Dr. Cristy Herrera PGY2    ADDENDUM  @ 445p  - Orthos performed prior to IV hydration   - Pt failed orthos due to tachycardia from 70 to 103; additionally with lightheadedness and near-syncope  - STAT CBC with H/H 12.3/37.4->11.1/32.5  - WBC 11.97 -> 15.97  - Resuscitate with IV hydration and repeat orthos after, can repeat CBC to ensure stability  - Continue to monitor, pad counts    Jeovanny Herrera PGY2

## 2024-07-17 NOTE — H&P ADULT - ASSESSMENT
The patient is a 34y Female complaining of vaginal bleeding.    Syncope:    Two episodes  Likely from hormonal changes  IVF  TTE  Orthostasis  Gyn eval appreciated    Bleeding PV:    Gyn eval appreciated  CBC in AM    Dw family

## 2024-07-17 NOTE — ED ADULT NURSE REASSESSMENT NOTE - NS ED NURSE REASSESS COMMENT FT1
Patient got up to use bathroom and had syncopal episode. Caught patient before hitting floor and carried to bed. FS and EKG done and TAD Casey made aware. Patient placed on cardiac monitor.

## 2024-07-17 NOTE — ED PROVIDER NOTE - OBJECTIVE STATEMENT
34-year-old female no reported past medical history , 17 weeks gestation recently found to have miscarriage planned for D&C tomorrow with Dr Koroma, presents brought in by ambulance for heavy vaginal bleeding.  Was seen by a [facility] yesterday, took 1 dose of mifepristone this morning, a few hours after taking developed severe abdominal cramping and has had bright red bleeding since then passing clots and suspected products.  Patient soaked through 2 towels.  He is currently bleeding.  Reports associated lightheadedness/dizziness but no LOC, reports associated lethargy.  Not on any additional medications.  Denies CP, SOB, urinary symptoms, fever, chills

## 2024-07-17 NOTE — ED ADULT NURSE NOTE - OBJECTIVE STATEMENT
Patient BIBEMS c/o vaginal bleeding today. Patient was scheduled for D&C tomorrow and was given 1 dose of mifepristone which she took this morning. After taking med, patient states she had heavy vaginal bleeding and believes she passed something. Patient reports weakness and dizziness. Patient is , approx. 17 weeks pregnant. Patient A&Ox4, ambulatory. Family at bedside. Plan of care in progress.

## 2024-07-17 NOTE — ED PROVIDER NOTE - SHIFT CHANGE DETAILS
Attending MD Zhou: 34F here visiting from [place], , took Misoprostol today for nonviable pregnancy at 17wks, felt passed POC but continued to bleed, here os open, Ob/Gyn consulted, US done, pending final Ob/Gyn recs and repeat CBC at 17:00

## 2024-07-17 NOTE — ED PROVIDER NOTE - CHIEF COMPLAINT
The patient is a 34y Female complaining of vaginal bleeding.
PAST SURGICAL HISTORY:  No significant past surgical history

## 2024-07-18 ENCOUNTER — TRANSCRIPTION ENCOUNTER (OUTPATIENT)
Age: 34
End: 2024-07-18

## 2024-07-18 ENCOUNTER — APPOINTMENT (OUTPATIENT)
Dept: OBGYN | Facility: HOSPITAL | Age: 34
End: 2024-07-18

## 2024-07-18 ENCOUNTER — RESULT REVIEW (OUTPATIENT)
Age: 34
End: 2024-07-18

## 2024-07-18 VITALS — RESPIRATION RATE: 18 BRPM | OXYGEN SATURATION: 99 % | HEART RATE: 76 BPM

## 2024-07-18 LAB
ALBUMIN SERPL ELPH-MCNC: 3.1 G/DL — LOW (ref 3.3–5)
ALP SERPL-CCNC: 49 U/L — SIGNIFICANT CHANGE UP (ref 40–120)
ALT FLD-CCNC: <5 U/L — LOW (ref 10–45)
ANION GAP SERPL CALC-SCNC: 10 MMOL/L — SIGNIFICANT CHANGE UP (ref 5–17)
AST SERPL-CCNC: 12 U/L — SIGNIFICANT CHANGE UP (ref 10–40)
BILIRUB SERPL-MCNC: 1.4 MG/DL — HIGH (ref 0.2–1.2)
BUN SERPL-MCNC: 7 MG/DL — SIGNIFICANT CHANGE UP (ref 7–23)
CALCIUM SERPL-MCNC: 8.2 MG/DL — LOW (ref 8.4–10.5)
CHLORIDE SERPL-SCNC: 107 MMOL/L — SIGNIFICANT CHANGE UP (ref 96–108)
CO2 SERPL-SCNC: 21 MMOL/L — LOW (ref 22–31)
CREAT SERPL-MCNC: 0.74 MG/DL — SIGNIFICANT CHANGE UP (ref 0.5–1.3)
EGFR: 110 ML/MIN/1.73M2 — SIGNIFICANT CHANGE UP
GLUCOSE SERPL-MCNC: 90 MG/DL — SIGNIFICANT CHANGE UP (ref 70–99)
HCT VFR BLD CALC: 26.3 % — LOW (ref 34.5–45)
HGB BLD-MCNC: 8.9 G/DL — LOW (ref 11.5–15.5)
MCHC RBC-ENTMCNC: 30.6 PG — SIGNIFICANT CHANGE UP (ref 27–34)
MCHC RBC-ENTMCNC: 33.8 GM/DL — SIGNIFICANT CHANGE UP (ref 32–36)
MCV RBC AUTO: 90.4 FL — SIGNIFICANT CHANGE UP (ref 80–100)
NRBC # BLD: 0 /100 WBCS — SIGNIFICANT CHANGE UP (ref 0–0)
PLATELET # BLD AUTO: 191 K/UL — SIGNIFICANT CHANGE UP (ref 150–400)
POTASSIUM SERPL-MCNC: 3.7 MMOL/L — SIGNIFICANT CHANGE UP (ref 3.5–5.3)
POTASSIUM SERPL-SCNC: 3.7 MMOL/L — SIGNIFICANT CHANGE UP (ref 3.5–5.3)
PROT SERPL-MCNC: 5.1 G/DL — LOW (ref 6–8.3)
RBC # BLD: 2.91 M/UL — LOW (ref 3.8–5.2)
RBC # FLD: 13.7 % — SIGNIFICANT CHANGE UP (ref 10.3–14.5)
SODIUM SERPL-SCNC: 138 MMOL/L — SIGNIFICANT CHANGE UP (ref 135–145)
WBC # BLD: 6.69 K/UL — SIGNIFICANT CHANGE UP (ref 3.8–10.5)
WBC # FLD AUTO: 6.69 K/UL — SIGNIFICANT CHANGE UP (ref 3.8–10.5)

## 2024-07-18 PROCEDURE — 88233 TISSUE CULTURE SKIN/BIOPSY: CPT

## 2024-07-18 PROCEDURE — 88305 TISSUE EXAM BY PATHOLOGIST: CPT

## 2024-07-18 PROCEDURE — 88280 CHROMOSOME KARYOTYPE STUDY: CPT

## 2024-07-18 PROCEDURE — 86850 RBC ANTIBODY SCREEN: CPT

## 2024-07-18 PROCEDURE — 82962 GLUCOSE BLOOD TEST: CPT

## 2024-07-18 PROCEDURE — 85730 THROMBOPLASTIN TIME PARTIAL: CPT

## 2024-07-18 PROCEDURE — 86901 BLOOD TYPING SEROLOGIC RH(D): CPT

## 2024-07-18 PROCEDURE — 85610 PROTHROMBIN TIME: CPT

## 2024-07-18 PROCEDURE — 85025 COMPLETE CBC W/AUTO DIFF WBC: CPT

## 2024-07-18 PROCEDURE — 93306 TTE W/DOPPLER COMPLETE: CPT

## 2024-07-18 PROCEDURE — 93975 VASCULAR STUDY: CPT

## 2024-07-18 PROCEDURE — 80053 COMPREHEN METABOLIC PANEL: CPT

## 2024-07-18 PROCEDURE — 85027 COMPLETE CBC AUTOMATED: CPT

## 2024-07-18 PROCEDURE — 88264 CHROMOSOME ANALYSIS 20-25: CPT

## 2024-07-18 PROCEDURE — 84702 CHORIONIC GONADOTROPIN TEST: CPT

## 2024-07-18 PROCEDURE — 93306 TTE W/DOPPLER COMPLETE: CPT | Mod: 26

## 2024-07-18 PROCEDURE — 99285 EMERGENCY DEPT VISIT HI MDM: CPT

## 2024-07-18 PROCEDURE — 76805 OB US >/= 14 WKS SNGL FETUS: CPT

## 2024-07-18 PROCEDURE — 93356 MYOCRD STRAIN IMG SPCKL TRCK: CPT

## 2024-07-18 PROCEDURE — 81001 URINALYSIS AUTO W/SCOPE: CPT

## 2024-07-18 PROCEDURE — 86900 BLOOD TYPING SEROLOGIC ABO: CPT

## 2024-07-18 PROCEDURE — 93308 TTE F-UP OR LMTD: CPT

## 2024-07-18 NOTE — DISCHARGE NOTE NURSING/CASE MANAGEMENT/SOCIAL WORK - PATIENT PORTAL LINK FT
You can access the FollowMyHealth Patient Portal offered by [facility] by registering at the following website: [email or web address]. By joining [facility]’s FollowMyHealth portal, you will also be able to view your health information using other applications (apps) compatible with our system.

## 2024-07-18 NOTE — DISCHARGE NOTE PROVIDER - NSDCCPCAREPLAN_GEN_ALL_CORE_FT
PRINCIPAL DISCHARGE DIAGNOSIS  Diagnosis: Vaginal bleeding  Assessment and Plan of Treatment: improved now very minimal      SECONDARY DISCHARGE DIAGNOSES  Diagnosis: Spontaneous   Assessment and Plan of Treatment: passed fetus, bleeding minimal    Diagnosis: Syncope  Assessment and Plan of Treatment: HOME CARE INSTRUCTIONS  Have someone stay with you until you feel stable.  Do not drive, operate machinery, or play sports until your caregiver says it is okay.  Keep all follow-up appointments as directed by your caregiver.   Lie down right away if you start feeling like you might faint. Breathe deeply and steadily. Wait until all the symptoms have passed.Drink enough fluids to keep your urine clear or pale yellow.  If you are taking blood pressure or heart medicine, get up slowly, taking several minutes to sit and then stand. This can reduce dizziness.  SEEK IMMEDIATE MEDICAL CARE IF:  You have a severe headache.  You have unusual pain in the chest, abdomen, or back.  You are bleeding from the mouth or rectum, or you have black or tarry stool.  You have an irregular or very fast heartbeat.  You have pain with breathing.  You have repeated fainting or seizure-like jerking during an episode.  You faint when sitting or lying down.  You have confusion.  You have difficulty walking.  You have severe weakness.  You have vision problems.  If you fainted, call your local emergency services (_____________________). Do not drive yourself to the hospital

## 2024-07-18 NOTE — DISCHARGE NOTE PROVIDER - HOSPITAL COURSE
HPI:  34-year-old female no reported past medical history , 17 weeks gestation recently found to have miscarriage planned for D&C tomorrow with Dr Koroma, presents brought in by ambulance for heavy vaginal bleeding.  Was seen by a [facility] OB[facility] yesterday, took 1 dose of mifepristone this morning, a few hours after taking developed severe abdominal cramping and has had bright red bleeding since then passing clots and suspected products.  Patient soaked through 2 towels.  He is currently bleeding.  Reports associated lightheadedness/dizziness but no LOC, reports associated lethargy.     (2024 22:01)    Hospital Course: Patient admitted with  syncope Vaginal  bleeding H/H  10/31 and spontaneous  . Hemoglobin stable , no indication for blood transfusion.    > Syncope:    - Two episodes  - Likely from hormonal changes  - s/p IVF  TTE- CONCLUSIONS:      1. Left ventricular cavity is normal in size. Left ventricular wall thickness is normal. Left ventricular systolic function is normal. There are no regional wall motion abnormalities seen.   2. Normal left ventricular diastolic function, with normal left ventricular filling pressure.   3. Normal right ventricular cavity size, with normal wall thickness, and normal right ventricular systolic function.   4. Posterior mitral annular dysjunction. Consider cMRI to further assess.   5. No pericardial effusion seen.   6. Left ventricular global longitudinal strain is -25.5 % which is normal (< -18%). Images were acquired on a [employer] ultrasound system and processed using [employer] strain analysis software with a heart rate of 76 bpm and a blood pressure of 103/63 mmHg.   7. No prior echocardiogram is available for comparison.   8. Prolapse of both mitral leaflets.  Orthostasis- pos, s/p IVF    > spontaneous    - Recommend IV hydration and orthostatic vitals  - s/p  TVUS with products of conception within the vaginal canal c/w  in progress with low concern for retained products at this time per GYN  Gyn eval appreciated      Important Medication Changes and Reason:    Active or Pending Issues Requiring Follow-up: follow up with OB GYN    Advanced Directives:   [ X] Full code  [ ] DNR  [ ] Hospice    Discharge Diagnoses:  Vaginal  bleeding H/H  10/31  Spontaneous     Syncope

## 2024-07-18 NOTE — DISCHARGE NOTE NURSING/CASE MANAGEMENT/SOCIAL WORK - NSDCPEFALRISK_GEN_ALL_CORE
For information on Fall & Injury Prevention, visit: [email or web address] OR  [email or web address] OR  https://www.cdc.gov/steadi/patient.html

## 2024-07-18 NOTE — DISCHARGE NOTE PROVIDER - NSDCQMAMI_CARD_ALL_CORE
Patient called returning call from staff. She states that all the medications are making her shaky and has not gone away.    No

## 2024-07-24 PROBLEM — N83.209 UNSPECIFIED OVARIAN CYST, UNSPECIFIED SIDE: Chronic | Status: ACTIVE | Noted: 2024-07-16

## 2024-07-24 PROBLEM — O02.1 MISSED ABORTION: Chronic | Status: ACTIVE | Noted: 2024-07-16

## 2024-07-25 ENCOUNTER — TRANSCRIPTION ENCOUNTER (OUTPATIENT)
Age: 34
End: 2024-07-25

## 2024-08-07 LAB — SURGICAL PATHOLOGY STUDY: SIGNIFICANT CHANGE UP

## 2024-10-02 NOTE — ED ADULT NURSE NOTE - AS PAIN REST
Medication:     isosorbide mononitrate (IMDUR) 30 MG 24 hr tablet     Last office visit date: 01/19/2023  Medication Refill Protocol Failed.  Failed criteria: chart review. Sent to clinician to review.        Vasodilators Refill Protocol - 12 Month Protocol [id number]/30/2024 09:52 PM   Protocol Details Seen by prescribing provider or same specialty within the last 12 months or has a future appt in 3 months - IF FAILED PLEASE LOOK AT CHART REVIEW FOR LAST VISIT AND PROCEED ACCORDINGLY        No appt, will route to [facility]   5 (moderate pain)